# Patient Record
Sex: MALE | Race: WHITE | NOT HISPANIC OR LATINO | Employment: OTHER | URBAN - METROPOLITAN AREA
[De-identification: names, ages, dates, MRNs, and addresses within clinical notes are randomized per-mention and may not be internally consistent; named-entity substitution may affect disease eponyms.]

---

## 2023-11-15 ENCOUNTER — OFFICE VISIT (OUTPATIENT)
Dept: ORTHOPEDIC SURGERY | Facility: CLINIC | Age: 73
End: 2023-11-15
Payer: MEDICARE

## 2023-11-15 ENCOUNTER — ANCILLARY PROCEDURE (OUTPATIENT)
Dept: RADIOLOGY | Facility: CLINIC | Age: 73
End: 2023-11-15
Payer: MEDICARE

## 2023-11-15 DIAGNOSIS — M79.671 RIGHT FOOT PAIN: ICD-10-CM

## 2023-11-15 DIAGNOSIS — M19.079 ARTHRITIS OF FOOT: ICD-10-CM

## 2023-11-15 DIAGNOSIS — S99.921D INJURY OF RIGHT FOOT, SUBSEQUENT ENCOUNTER: ICD-10-CM

## 2023-11-15 DIAGNOSIS — T84.498S FAILED ORTHOPEDIC IMPLANT, SEQUELA: Primary | ICD-10-CM

## 2023-11-15 PROBLEM — T84.498A FAILED ORTHOPEDIC IMPLANT (CMS-HCC): Status: ACTIVE | Noted: 2023-11-15

## 2023-11-15 PROCEDURE — 73630 X-RAY EXAM OF FOOT: CPT | Mod: RT,FY

## 2023-11-15 PROCEDURE — 99204 OFFICE O/P NEW MOD 45 MIN: CPT | Performed by: ORTHOPAEDIC SURGERY

## 2023-11-15 PROCEDURE — 1160F RVW MEDS BY RX/DR IN RCRD: CPT | Performed by: ORTHOPAEDIC SURGERY

## 2023-11-15 PROCEDURE — 1159F MED LIST DOCD IN RCRD: CPT | Performed by: ORTHOPAEDIC SURGERY

## 2023-11-15 PROCEDURE — 73630 X-RAY EXAM OF FOOT: CPT | Mod: RIGHT SIDE | Performed by: RADIOLOGY

## 2023-11-15 RX ORDER — SODIUM CHLORIDE, SODIUM LACTATE, POTASSIUM CHLORIDE, CALCIUM CHLORIDE 600; 310; 30; 20 MG/100ML; MG/100ML; MG/100ML; MG/100ML
100 INJECTION, SOLUTION INTRAVENOUS CONTINUOUS
Status: CANCELLED | OUTPATIENT
Start: 2023-11-15

## 2023-11-15 NOTE — PROGRESS NOTES
73-year-old male returns for new problem.  Seen years ago for left Achilles.  Has had progressive pain in the right forefoot.  Had a remote podiatric procedure on his right great toe with a silicone implant.  Had increasing pain around the right great second toe.  Some pain in the third toe we had previous hammertoe surgery.  Likes to walk for exercise.  Pain is getting worse over time.  No pain along the arch.    On exam:  WD/WN athletic male  A+O X3  NAD  No lymphedema  Inspection of both feet and ankles show symmetric arches.   Tender crepitus with passive motion second MTP joint right foot.  Pain with limited motion right great toe.  Pain in the DIP joint right third toe with malrotation.  5/5 strength in all 4 planes.   Sensation intact to LT.   Good pulses.   Stable anterior drawer.  No peroneal subluxation.    (-) Silverskold.     I personally reviewed the following radiographic exams: Right foot shows progressive arthrosis of the second MTP joint.  No acute changes.  Erosion of the first MTP silicone implant without significant change since 2016.    Assessment: Failed right first MTP silicone implant.  Progressive arthritis right second MTP joint.  Painful right mallet toe.    Plan: Discussed nonoperative and operative options in detail.   Risk and benefits discussed in detail. All questions answered today.  Recovery timeline and expectations discussed in detail.  Discussed surgical correction.  Would need implant removal from the first MTP joint and fusion.  We will plan on using allograft bone chips and DBM and to avoid ICBG.  Discussed interposition arthroplasty for the second MTP joint.  Discussed revision DIP fusion with the third toe.  Right first MTP fusion [id number]; right second MTP arthroplasty with interposition 10598; revision third DIP fusion [id number]., Arthrex first MTP set ; K wire C-arm,.. General plus regional.  60 minutes  Crutches or Walker/ Consider knee scooter   postop shoe

## 2023-12-13 ENCOUNTER — ANCILLARY PROCEDURE (OUTPATIENT)
Dept: RADIOLOGY | Facility: CLINIC | Age: 73
End: 2023-12-13
Payer: MEDICARE

## 2023-12-13 ENCOUNTER — OFFICE VISIT (OUTPATIENT)
Dept: ORTHOPEDIC SURGERY | Facility: CLINIC | Age: 73
End: 2023-12-13
Payer: MEDICARE

## 2023-12-13 VITALS — BODY MASS INDEX: 35.29 KG/M2 | HEIGHT: 74 IN | WEIGHT: 275 LBS

## 2023-12-13 DIAGNOSIS — M76.62 ACHILLES TENDINITIS OF LEFT LOWER EXTREMITY: ICD-10-CM

## 2023-12-13 DIAGNOSIS — M19.079 ARTHRITIS OF FOOT: ICD-10-CM

## 2023-12-13 DIAGNOSIS — M77.9 BONE SPUR: Primary | ICD-10-CM

## 2023-12-13 PROCEDURE — 1159F MED LIST DOCD IN RCRD: CPT | Performed by: ORTHOPAEDIC SURGERY

## 2023-12-13 PROCEDURE — 1125F AMNT PAIN NOTED PAIN PRSNT: CPT | Performed by: ORTHOPAEDIC SURGERY

## 2023-12-13 PROCEDURE — 1160F RVW MEDS BY RX/DR IN RCRD: CPT | Performed by: ORTHOPAEDIC SURGERY

## 2023-12-13 PROCEDURE — 73630 X-RAY EXAM OF FOOT: CPT | Mod: LT,FY

## 2023-12-13 PROCEDURE — 1036F TOBACCO NON-USER: CPT | Performed by: ORTHOPAEDIC SURGERY

## 2023-12-13 PROCEDURE — 99214 OFFICE O/P EST MOD 30 MIN: CPT | Performed by: ORTHOPAEDIC SURGERY

## 2023-12-13 PROCEDURE — 73630 X-RAY EXAM OF FOOT: CPT | Mod: LEFT SIDE | Performed by: RADIOLOGY

## 2023-12-13 ASSESSMENT — PAIN SCALES - GENERAL: PAINLEVEL_OUTOF10: 2

## 2023-12-13 ASSESSMENT — PAIN DESCRIPTION - DESCRIPTORS: DESCRIPTORS: ACHING

## 2023-12-13 ASSESSMENT — PAIN - FUNCTIONAL ASSESSMENT: PAIN_FUNCTIONAL_ASSESSMENT: 0-10

## 2023-12-13 NOTE — PROGRESS NOTES
Was considering R foot surgery in January. 3 weeks ago L foot started hurting.  Noted some pain over the medial aspect of the left great toe.  Is having some Achilles tendon pain.  A lot of this occurred when he was trying to work with crutches and walker to be nonweightbearing for his right foot surgery.  Does have a knee scooter as well.  Really has no left foot pain when walking barefoot or in slippers at home.    Exam: Tender spur dorsal medial first metatarsal.  Limited first MTP motion without pain.  Mild tenderness over the left Achilles.  No defect.  Negative Acevedo test.    I personally reviewed the following radiographic exams: Left foot shows dorsal medial spur first metatarsal.  Symmetric joint space narrowing.  No acute changes.    Assessment: Left first MTP arthrosis with spur.  Achilles tendinitis.  Failed right forefoot podiatric surgery.    Plan: Discussed nonoperative and operative options in detail.   Risk and benefits discussed in detail. All questions answered today.  Recovery timeline and expectations discussed in detail.  While cheilectomy or first MTP fusion on the left is reasonable, we discussed there is right foot surgery and recovery in detail.  He was under the impression he would be nonweightbearing for an extended period of time after his right foot surgery.  He would really only be nonweightbearing for 24 to 36 hours with the blocks in place.  He thinks he can manage this.  If down the road his left foot continues to bother him there are surgical options if necessary.  He will proceed with a right foot surgery as scheduled in January.

## 2024-01-10 ENCOUNTER — APPOINTMENT (OUTPATIENT)
Dept: PREADMISSION TESTING | Facility: HOSPITAL | Age: 74
End: 2024-01-10
Payer: MEDICARE

## 2024-01-10 RX ORDER — ASPIRIN 81 MG/1
81 TABLET ORAL DAILY
COMMUNITY

## 2024-01-10 RX ORDER — ASCORBATE CALCIUM/BIOFLAVONOID 500-250 MG
1 TABLET ORAL DAILY
COMMUNITY

## 2024-01-10 RX ORDER — DOCUSATE SODIUM 100 MG/1
100 CAPSULE, LIQUID FILLED ORAL 2 TIMES DAILY
COMMUNITY

## 2024-01-10 RX ORDER — DICLOFENAC SODIUM 10 MG/G
4 GEL TOPICAL 4 TIMES DAILY PRN
COMMUNITY

## 2024-01-10 RX ORDER — ATORVASTATIN CALCIUM 40 MG/1
40 TABLET, FILM COATED ORAL DAILY
COMMUNITY

## 2024-01-10 RX ORDER — AMLODIPINE BESYLATE 5 MG/1
5 TABLET ORAL DAILY
COMMUNITY

## 2024-01-10 RX ORDER — CICLOPIROX OLAMINE 7.7 MG/G
1 CREAM TOPICAL 2 TIMES DAILY
COMMUNITY

## 2024-01-10 RX ORDER — HYDROCHLOROTHIAZIDE 25 MG/1
25 TABLET ORAL DAILY
COMMUNITY

## 2024-01-10 RX ORDER — FAMOTIDINE 20 MG/1
20 TABLET, FILM COATED ORAL 2 TIMES DAILY
COMMUNITY

## 2024-01-10 RX ORDER — POTASSIUM CHLORIDE 20 MEQ/1
20 TABLET, EXTENDED RELEASE ORAL DAILY
COMMUNITY

## 2024-01-10 RX ORDER — LOSARTAN POTASSIUM 100 MG/1
100 TABLET ORAL DAILY
COMMUNITY

## 2024-01-10 RX ORDER — ASCORBIC ACID 500 MG
500 TABLET ORAL DAILY
COMMUNITY

## 2024-01-10 RX ORDER — SILDENAFIL CITRATE 20 MG/1
20 TABLET ORAL 3 TIMES DAILY
COMMUNITY

## 2024-01-10 RX ORDER — BISMUTH SUBSALICYLATE 262 MG
1 TABLET,CHEWABLE ORAL DAILY
COMMUNITY

## 2024-01-12 ENCOUNTER — PRE-ADMISSION TESTING (OUTPATIENT)
Dept: PREADMISSION TESTING | Facility: HOSPITAL | Age: 74
End: 2024-01-12
Payer: MEDICARE

## 2024-01-12 ENCOUNTER — LAB (OUTPATIENT)
Dept: LAB | Facility: LAB | Age: 74
End: 2024-01-12
Payer: MEDICARE

## 2024-01-12 VITALS
SYSTOLIC BLOOD PRESSURE: 134 MMHG | OXYGEN SATURATION: 97 % | WEIGHT: 272.71 LBS | HEART RATE: 67 BPM | RESPIRATION RATE: 16 BRPM | DIASTOLIC BLOOD PRESSURE: 71 MMHG | TEMPERATURE: 97.7 F | BODY MASS INDEX: 36.14 KG/M2 | HEIGHT: 73 IN

## 2024-01-12 DIAGNOSIS — Z01.818 PREOP TESTING: Primary | ICD-10-CM

## 2024-01-12 DIAGNOSIS — S99.921D INJURY OF RIGHT FOOT, SUBSEQUENT ENCOUNTER: ICD-10-CM

## 2024-01-12 DIAGNOSIS — T84.498S FAILED ORTHOPEDIC IMPLANT, SEQUELA: ICD-10-CM

## 2024-01-12 DIAGNOSIS — M19.079 ARTHRITIS OF FOOT: ICD-10-CM

## 2024-01-12 LAB
ANION GAP SERPL CALC-SCNC: 11 MMOL/L (ref 10–20)
BUN SERPL-MCNC: 14 MG/DL (ref 6–23)
CALCIUM SERPL-MCNC: 9.3 MG/DL (ref 8.6–10.3)
CHLORIDE SERPL-SCNC: 102 MMOL/L (ref 98–107)
CO2 SERPL-SCNC: 29 MMOL/L (ref 21–32)
CREAT SERPL-MCNC: 0.74 MG/DL (ref 0.5–1.3)
EGFRCR SERPLBLD CKD-EPI 2021: >90 ML/MIN/1.73M*2
ERYTHROCYTE [DISTWIDTH] IN BLOOD BY AUTOMATED COUNT: 12.1 % (ref 11.5–14.5)
GLUCOSE SERPL-MCNC: 103 MG/DL (ref 74–99)
HCT VFR BLD AUTO: 42.2 % (ref 41–52)
HGB BLD-MCNC: 14.4 G/DL (ref 13.5–17.5)
MCH RBC QN AUTO: 31.9 PG (ref 26–34)
MCHC RBC AUTO-ENTMCNC: 34.1 G/DL (ref 32–36)
MCV RBC AUTO: 93 FL (ref 80–100)
NRBC BLD-RTO: 0 /100 WBCS (ref 0–0)
PLATELET # BLD AUTO: 169 X10*3/UL (ref 150–450)
POTASSIUM SERPL-SCNC: 3.6 MMOL/L (ref 3.5–5.3)
RBC # BLD AUTO: 4.52 X10*6/UL (ref 4.5–5.9)
SODIUM SERPL-SCNC: 138 MMOL/L (ref 136–145)
WBC # BLD AUTO: 6.4 X10*3/UL (ref 4.4–11.3)

## 2024-01-12 PROCEDURE — 85027 COMPLETE CBC AUTOMATED: CPT

## 2024-01-12 PROCEDURE — 87081 CULTURE SCREEN ONLY: CPT | Mod: AHULAB | Performed by: ORTHOPAEDIC SURGERY

## 2024-01-12 PROCEDURE — 99203 OFFICE O/P NEW LOW 30 MIN: CPT | Performed by: PHYSICIAN ASSISTANT

## 2024-01-12 PROCEDURE — 80048 BASIC METABOLIC PNL TOTAL CA: CPT

## 2024-01-12 PROCEDURE — 93005 ELECTROCARDIOGRAM TRACING: CPT | Performed by: PHYSICIAN ASSISTANT

## 2024-01-12 PROCEDURE — 36415 COLL VENOUS BLD VENIPUNCTURE: CPT

## 2024-01-12 RX ORDER — ACETAMINOPHEN 500 MG
1000 TABLET ORAL EVERY 6 HOURS PRN
COMMUNITY

## 2024-01-12 RX ORDER — CHLORHEXIDINE GLUCONATE ORAL RINSE 1.2 MG/ML
SOLUTION DENTAL
Qty: 473 ML | Refills: 0 | Status: SHIPPED | OUTPATIENT
Start: 2024-01-12

## 2024-01-12 ASSESSMENT — ENCOUNTER SYMPTOMS
GASTROINTESTINAL NEGATIVE: 1
EYES NEGATIVE: 1
CONSTITUTIONAL NEGATIVE: 1
PSYCHIATRIC NEGATIVE: 1
RESPIRATORY NEGATIVE: 1
CARDIOVASCULAR NEGATIVE: 1
HEMATOLOGIC/LYMPHATIC NEGATIVE: 1
ALLERGIC/IMMUNOLOGIC NEGATIVE: 1
NEUROLOGICAL NEGATIVE: 1
ENDOCRINE NEGATIVE: 1

## 2024-01-12 NOTE — H&P (VIEW-ONLY)
[facility]/PAT Evaluation       Name: Scotty Amaro (Scotty Amaro)  /Age: 1950/73 y.o.     In-Person       Chief Complaint: Failed orthopedic implant, sequela /    Arthritis of foot     HPI      Date of Consult: 24    Referring Provider: Dr. Villa     Surgery, Date, and Length: Right Foot Arthrodesis /  Excision Bone Foot / Right Foot Claw/Hammertoe Correction ; 24; 120 minutes     Scotty Amaro is a 73 year-old male who presents to the [facility] for perioperative risk assessment prior to surgery.  He has had progressive pain in the right forefoot.  Had a remote podiatric procedure on his right great toe with a silicone implant.  Had increasing pain around the right great second toe.  Some pain in the third toe we had previous hammertoe surgery.  Likes to walk for exercise.  Pain is getting worse over time.     This note was created in part upon personal review of patient's medical records.      Patient is scheduled to have Right Foot Arthrodesis /  Excision Bone Foot / Right Foot Claw/Hammertoe Correction     Medical History  Past Medical History:   Diagnosis Date    Arthritis     Basal cell carcinoma     face    Cardiology follow-up encounter 2023    Lee Boston MD    Diverticulosis of colon     DM2 (diabetes mellitus, type 2) (CMS/[facility])     23 A1C 5.8    Hyperlipidemia     Hypertension     Lumbar spinal stenosis     RADHA (obstructive sleep apnea)     uses CPAP    Primary prostate cancer (CMS/[facility])     PVC's (premature ventricular contractions)     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 2018        STOP BANG = +RADHA    Caprini = 7    Surgical History  Past Surgical History:   Procedure Laterality Date    ABDOMINAL WALL MESH  REMOVAL  1998    ACHILLES TENDON SURGERY Left 2016    CARDIAC CATHETERIZATION      no intervention    CARPAL TUNNEL RELEASE Bilateral     COLONOSCOPY  2020    ESOPHAGOGASTRODUODENOSCOPY  2020    FOOT Bilateral     HERNIA REPAIR  1950     Inguinal    PROSTATE BIOPSY  2016    PROSTATECTOMY  1997    REVERSE TOTAL SHOULDER ARTHROPLASTY Right 2019    TOTAL HIP ARTHROPLASTY Left 2019    TOTAL HIP ARTHROPLASTY Right 2004    TOTAL KNEE ARTHROPLASTY Right 2015    UMBILICAL HERNIA REPAIR  2018    UMBILICAL HERNIA REPAIR  1998    VENTRAL HERNIA REPAIR  2018    XR TOE GREAT RT Right     right great toe joint implant             Family history:  Family History   Problem Relation Name Age of Onset    Transient ischemic attack Mother      Prostate cancer Father      Aortic aneurysm Father      Stroke Sister      Sick sinus syndrome Brother          Pacemaker    Sudden death Brother  45        Social history:  Social History     Socioeconomic History    Marital status:      Spouse name: Not on file    Number of children: Not on file    Years of education: Not on file    Highest education level: Not on file   Occupational History    Not on file   Tobacco Use    Smoking status: Former     Packs/day: 2.50     Years: 18.00     Additional pack years: 0.00     Total pack years: 45.00     Types: Cigarettes     Quit date:      Years since quittin.0    Smokeless tobacco: Never   Vaping Use    Vaping Use: Never used   Substance and Sexual Activity    Alcohol use: Yes     Alcohol/week: 4.0 standard drinks of alcohol     Types: 4 Standard drinks or equivalent per week    Drug use: Never    Sexual activity: Defer   Other Topics Concern    Not on file   Social History Narrative    Not on file     Social Determinants of Health     Financial Resource Strain: Not on file   Food Insecurity: Not on file   Transportation Needs: Not on file   Physical Activity: Not on file   Stress: Not on file   Social Connections: Not on file   Intimate Partner Violence: Not on file   Housing Stability: Not on file          Current Outpatient Medications:     acetaminophen (Tylenol) 500 mg tablet, Take 2 tablets (1,000 mg) by mouth every 6 hours if needed for mild pain (1 - 3)., Disp:  , Rfl:     amLODIPine (Norvasc) 5 mg tablet, Take 1 tablet (5 mg) by mouth once daily., Disp: , Rfl:     ascorbic acid (Vitamin C) 500 mg tablet, Take 1 tablet (500 mg) by mouth once daily., Disp: , Rfl:     aspirin 81 mg EC tablet, Take 1 tablet (81 mg) by mouth once daily., Disp: , Rfl:     atorvastatin (Lipitor) 40 mg tablet, Take 1 tablet (40 mg) by mouth once daily., Disp: , Rfl:     Bacillus subtilis-inulin 1.5 billion cell-1 gram tablet,chewable, Chew 1 Dose once daily., Disp: , Rfl:     ciclopirox (Loprox) 0.77 % cream, Apply 1 Application topically 2 times a day., Disp: , Rfl:     COQ10, UBIQUINOL, ORAL, Take 300 mg by mouth once daily., Disp: , Rfl:     diclofenac sodium (Voltaren) 1 % gel gel, Apply 1 Application topically 4 times a day as needed (Apply to foot and ankle for arthritis up to 4 times a day)., Disp: , Rfl:     docusate sodium (Colace) 100 mg capsule, Take 1 capsule (100 mg) by mouth 2 times a day., Disp: , Rfl:     famotidine (Pepcid) 20 mg tablet, Take 1 tablet (20 mg) by mouth 2 times a day., Disp: , Rfl:     hydroCHLOROthiazide (HYDRODiuril) 25 mg tablet, Take 1 tablet (25 mg) by mouth once daily., Disp: , Rfl:     losartan (Cozaar) 100 mg tablet, Take 1 tablet (100 mg) by mouth once daily., Disp: , Rfl:     lutein-zeaxanthin 20-4 mg capsule, Take 1 Dose by mouth once daily., Disp: , Rfl:     multivitamin tablet, Take 1 tablet by mouth once daily., Disp: , Rfl:     potassium chloride CR 20 mEq ER tablet, Take 1 tablet (20 mEq) by mouth once daily. Do not crush or chew., Disp: , Rfl:     psyllium (Metamucil) powder, Take 1 Dose (5.8 g) by mouth once daily., Disp: , Rfl:     semaglutide 2 mg/dose (8 mg/3 mL) pen injector, Inject 2 mg under the skin every 7 days. Monday, Disp: , Rfl:     sildenafil (Revatio) 20 mg tablet, Take 1 tablet (20 mg) by mouth 3 times a day., Disp: , Rfl:     chlorhexidine (Peridex) 0.12 % solution, 15 ml swish and spit for 30 seconds night prior to surgery and  "morning of surgery, Disp: 473 mL, Rfl: 0    EPINEPHrine 0.3 mg/0.3 mL injection syringe, Inject 0.3 mL (0.3 mg) into the muscle 1 time if needed for anaphylaxis. Inject into upper leg. Call 911 after use., Disp: , Rfl:          Visit Vitals  /71   Pulse 67   Temp 36.5 °C (97.7 °F)   Resp 16   Ht 1.854 m (6' 1\")   Wt 124 kg (272 lb 11.3 oz)   SpO2 97%   BMI 35.98 kg/m²   Smoking Status Former   BSA 2.53 m²           Review of Systems   Constitutional: Negative.    HENT: Negative.     Eyes: Negative.    Respiratory: Negative.     Cardiovascular: Negative.    Gastrointestinal: Negative.    Endocrine: Negative.    Genitourinary: Negative.    Musculoskeletal:         Right foot pain; arthritic pain bilateral hands/wrists   Skin: Negative.    Allergic/Immunologic: Negative.    Neurological: Negative.    Hematological: Negative.    Psychiatric/Behavioral: Negative.          Physical Exam  Constitutional:       Appearance: Normal appearance.   HENT:      Head: Normocephalic and atraumatic.      Right Ear: External ear normal.      Left Ear: External ear normal.      Nose: Nose normal.      Mouth/Throat:      Pharynx: Oropharynx is clear.   Eyes:      Conjunctiva/sclera: Conjunctivae normal.   Cardiovascular:      Rate and Rhythm: Normal rate and regular rhythm.      Heart sounds: Normal heart sounds.   Pulmonary:      Effort: Pulmonary effort is normal.      Breath sounds: Normal breath sounds.   Abdominal:      General: Abdomen is flat.      Palpations: Abdomen is soft.   Musculoskeletal:         General: Normal range of motion.      Cervical back: Normal range of motion and neck supple.   Skin:     General: Skin is warm and dry.   Neurological:      General: No focal deficit present.      Mental Status: He is alert and oriented to person, place, and time.   Psychiatric:         Mood and Affect: Mood normal.         Behavior: Behavior normal.         Thought Content: Thought content normal.         Judgment: Judgment " normal.          PAT AIRWAY:   Airway:     Mallampati::  II    Neck ROM::  Full    Lab Results   Component Value Date    WBC 6.4 01/12/2024    HGB 14.4 01/12/2024    HCT 42.2 01/12/2024    MCV 93 01/12/2024     01/12/2024      Lab Results   Component Value Date    GLUCOSE 103 (H) 01/12/2024    CALCIUM 9.3 01/12/2024     01/12/2024    K 3.6 01/12/2024    CO2 29 01/12/2024     01/12/2024    BUN 14 01/12/2024    CREATININE 0.74 01/12/2024      HgbA1c 12/14/23  Hemoglobin A1C  4.8 - 5.6 % 5.8 High      EKG 1/12/24  NSR  Left axis deviation  67 BPM      NMST 4/10/23  CONCLUSIONS:    1. SPECT Perfusion Study: Normal.    2. There is no scintigraphic evidence for inducible ischemia.    3. No evidence of scarred myocardium.    4. Left ventricle is normal in size. The left ventricle systolic   function is normal.    5. This is a low risk scan.       RCRI  0  , 3.9 % Risk of MACE    Cardiac  HTN - losartan - HOLD 24 hours prior to surgery; continue amlodipine and hydrochlorothiazide DOS   HLD - continue atorvastatin DOS   Pulmonary  RADHA - Please bring CPAP with you DOS    Endocrinology  DM - HOLD semaglutide 1 week prior to surgery  Hematology       Patient instructed to ambulate as soon as possible postoperatively to decrease thromboembolic risk.      Initiate mechanical DVT prophylaxis as soon as possible and initiate chemical prophylaxis when deemed safe from a bleeding standpoint post surgery.       VTE prophylaxis per surgical team   GI  GERD - continue famotidine DOS     Tests ordered in PAT: cbc, bmp, mrsa, EKG  LABS REVIEWED: unremarkable     Risk assessment complete.  Patient is scheduled for a low surgical risk procedure.        Preoperative medication instructions were provided and reviewed with the patient.  Any additional testing or evaluation was explained to the patient.  Nothing by mouth instructions were discussed and patient's questions were answered prior to conclusion to this encounter.   Patient verbalized understanding of preoperative instructions given in preadmission testing; discharge instructions available in EMR.    This note was dictated by a speech recognition.  Minor errors may have been detected in a speech recognition.

## 2024-01-12 NOTE — CPM/PAT H&P
[facility]/PAT Evaluation       Name: Scotty Amaro (Scotty Amaro)  /Age: 1950/73 y.o.     In-Person       Chief Complaint: Failed orthopedic implant, sequela /    Arthritis of foot     HPI      Date of Consult: 24    Referring Provider: Dr. Villa     Surgery, Date, and Length: Right Foot Arthrodesis /  Excision Bone Foot / Right Foot Claw/Hammertoe Correction ; 24; 120 minutes     Scotty Amaro is a 73 year-old male who presents to the [facility] for perioperative risk assessment prior to surgery.  He has had progressive pain in the right forefoot.  Had a remote podiatric procedure on his right great toe with a silicone implant.  Had increasing pain around the right great second toe.  Some pain in the third toe we had previous hammertoe surgery.  Likes to walk for exercise.  Pain is getting worse over time.     This note was created in part upon personal review of patient's medical records.      Patient is scheduled to have Right Foot Arthrodesis /  Excision Bone Foot / Right Foot Claw/Hammertoe Correction     Medical History  Past Medical History:   Diagnosis Date    Arthritis     Basal cell carcinoma     face    Cardiology follow-up encounter 2023    Lee Boston MD    Diverticulosis of colon     DM2 (diabetes mellitus, type 2) (CMS/[facility])     23 A1C 5.8    Hyperlipidemia     Hypertension     Lumbar spinal stenosis     RADHA (obstructive sleep apnea)     uses CPAP    Primary prostate cancer (CMS/[facility])     PVC's (premature ventricular contractions)     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 2018        STOP BANG = +RADHA    Caprini = 7    Surgical History  Past Surgical History:   Procedure Laterality Date    ABDOMINAL WALL MESH  REMOVAL  1998    ACHILLES TENDON SURGERY Left 2016    CARDIAC CATHETERIZATION      no intervention    CARPAL TUNNEL RELEASE Bilateral     COLONOSCOPY  2020    ESOPHAGOGASTRODUODENOSCOPY  2020    FOOT Bilateral     HERNIA REPAIR  1950     Inguinal    PROSTATE BIOPSY  2016    PROSTATECTOMY  1997    REVERSE TOTAL SHOULDER ARTHROPLASTY Right 2019    TOTAL HIP ARTHROPLASTY Left 2019    TOTAL HIP ARTHROPLASTY Right 2004    TOTAL KNEE ARTHROPLASTY Right 2015    UMBILICAL HERNIA REPAIR  2018    UMBILICAL HERNIA REPAIR  1998    VENTRAL HERNIA REPAIR  2018    XR TOE GREAT RT Right     right great toe joint implant             Family history:  Family History   Problem Relation Name Age of Onset    Transient ischemic attack Mother      Prostate cancer Father      Aortic aneurysm Father      Stroke Sister      Sick sinus syndrome Brother          Pacemaker    Sudden death Brother  45        Social history:  Social History     Socioeconomic History    Marital status:      Spouse name: Not on file    Number of children: Not on file    Years of education: Not on file    Highest education level: Not on file   Occupational History    Not on file   Tobacco Use    Smoking status: Former     Packs/day: 2.50     Years: 18.00     Additional pack years: 0.00     Total pack years: 45.00     Types: Cigarettes     Quit date:      Years since quittin.0    Smokeless tobacco: Never   Vaping Use    Vaping Use: Never used   Substance and Sexual Activity    Alcohol use: Yes     Alcohol/week: 4.0 standard drinks of alcohol     Types: 4 Standard drinks or equivalent per week    Drug use: Never    Sexual activity: Defer   Other Topics Concern    Not on file   Social History Narrative    Not on file     Social Determinants of Health     Financial Resource Strain: Not on file   Food Insecurity: Not on file   Transportation Needs: Not on file   Physical Activity: Not on file   Stress: Not on file   Social Connections: Not on file   Intimate Partner Violence: Not on file   Housing Stability: Not on file          Current Outpatient Medications:     acetaminophen (Tylenol) 500 mg tablet, Take 2 tablets (1,000 mg) by mouth every 6 hours if needed for mild pain (1 - 3)., Disp:  , Rfl:     amLODIPine (Norvasc) 5 mg tablet, Take 1 tablet (5 mg) by mouth once daily., Disp: , Rfl:     ascorbic acid (Vitamin C) 500 mg tablet, Take 1 tablet (500 mg) by mouth once daily., Disp: , Rfl:     aspirin 81 mg EC tablet, Take 1 tablet (81 mg) by mouth once daily., Disp: , Rfl:     atorvastatin (Lipitor) 40 mg tablet, Take 1 tablet (40 mg) by mouth once daily., Disp: , Rfl:     Bacillus subtilis-inulin 1.5 billion cell-1 gram tablet,chewable, Chew 1 Dose once daily., Disp: , Rfl:     ciclopirox (Loprox) 0.77 % cream, Apply 1 Application topically 2 times a day., Disp: , Rfl:     COQ10, UBIQUINOL, ORAL, Take 300 mg by mouth once daily., Disp: , Rfl:     diclofenac sodium (Voltaren) 1 % gel gel, Apply 1 Application topically 4 times a day as needed (Apply to foot and ankle for arthritis up to 4 times a day)., Disp: , Rfl:     docusate sodium (Colace) 100 mg capsule, Take 1 capsule (100 mg) by mouth 2 times a day., Disp: , Rfl:     famotidine (Pepcid) 20 mg tablet, Take 1 tablet (20 mg) by mouth 2 times a day., Disp: , Rfl:     hydroCHLOROthiazide (HYDRODiuril) 25 mg tablet, Take 1 tablet (25 mg) by mouth once daily., Disp: , Rfl:     losartan (Cozaar) 100 mg tablet, Take 1 tablet (100 mg) by mouth once daily., Disp: , Rfl:     lutein-zeaxanthin 20-4 mg capsule, Take 1 Dose by mouth once daily., Disp: , Rfl:     multivitamin tablet, Take 1 tablet by mouth once daily., Disp: , Rfl:     potassium chloride CR 20 mEq ER tablet, Take 1 tablet (20 mEq) by mouth once daily. Do not crush or chew., Disp: , Rfl:     psyllium (Metamucil) powder, Take 1 Dose (5.8 g) by mouth once daily., Disp: , Rfl:     semaglutide 2 mg/dose (8 mg/3 mL) pen injector, Inject 2 mg under the skin every 7 days. Monday, Disp: , Rfl:     sildenafil (Revatio) 20 mg tablet, Take 1 tablet (20 mg) by mouth 3 times a day., Disp: , Rfl:     chlorhexidine (Peridex) 0.12 % solution, 15 ml swish and spit for 30 seconds night prior to surgery and  "morning of surgery, Disp: 473 mL, Rfl: 0    EPINEPHrine 0.3 mg/0.3 mL injection syringe, Inject 0.3 mL (0.3 mg) into the muscle 1 time if needed for anaphylaxis. Inject into upper leg. Call 911 after use., Disp: , Rfl:          Visit Vitals  /71   Pulse 67   Temp 36.5 °C (97.7 °F)   Resp 16   Ht 1.854 m (6' 1\")   Wt 124 kg (272 lb 11.3 oz)   SpO2 97%   BMI 35.98 kg/m²   Smoking Status Former   BSA 2.53 m²           Review of Systems   Constitutional: Negative.    HENT: Negative.     Eyes: Negative.    Respiratory: Negative.     Cardiovascular: Negative.    Gastrointestinal: Negative.    Endocrine: Negative.    Genitourinary: Negative.    Musculoskeletal:         Right foot pain; arthritic pain bilateral hands/wrists   Skin: Negative.    Allergic/Immunologic: Negative.    Neurological: Negative.    Hematological: Negative.    Psychiatric/Behavioral: Negative.          Physical Exam  Constitutional:       Appearance: Normal appearance.   HENT:      Head: Normocephalic and atraumatic.      Right Ear: External ear normal.      Left Ear: External ear normal.      Nose: Nose normal.      Mouth/Throat:      Pharynx: Oropharynx is clear.   Eyes:      Conjunctiva/sclera: Conjunctivae normal.   Cardiovascular:      Rate and Rhythm: Normal rate and regular rhythm.      Heart sounds: Normal heart sounds.   Pulmonary:      Effort: Pulmonary effort is normal.      Breath sounds: Normal breath sounds.   Abdominal:      General: Abdomen is flat.      Palpations: Abdomen is soft.   Musculoskeletal:         General: Normal range of motion.      Cervical back: Normal range of motion and neck supple.   Skin:     General: Skin is warm and dry.   Neurological:      General: No focal deficit present.      Mental Status: He is alert and oriented to person, place, and time.   Psychiatric:         Mood and Affect: Mood normal.         Behavior: Behavior normal.         Thought Content: Thought content normal.         Judgment: Judgment " normal.          PAT AIRWAY:   Airway:     Mallampati::  II    Neck ROM::  Full    Lab Results   Component Value Date    WBC 6.4 01/12/2024    HGB 14.4 01/12/2024    HCT 42.2 01/12/2024    MCV 93 01/12/2024     01/12/2024      Lab Results   Component Value Date    GLUCOSE 103 (H) 01/12/2024    CALCIUM 9.3 01/12/2024     01/12/2024    K 3.6 01/12/2024    CO2 29 01/12/2024     01/12/2024    BUN 14 01/12/2024    CREATININE 0.74 01/12/2024      HgbA1c 12/14/23  Hemoglobin A1C  4.8 - 5.6 % 5.8 High      EKG 1/12/24  NSR  Left axis deviation  67 BPM      NMST 4/10/23  CONCLUSIONS:    1. SPECT Perfusion Study: Normal.    2. There is no scintigraphic evidence for inducible ischemia.    3. No evidence of scarred myocardium.    4. Left ventricle is normal in size. The left ventricle systolic   function is normal.    5. This is a low risk scan.       RCRI  0  , 3.9 % Risk of MACE    Cardiac  HTN - losartan - HOLD 24 hours prior to surgery; continue amlodipine and hydrochlorothiazide DOS   HLD - continue atorvastatin DOS   Pulmonary  RADHA - Please bring CPAP with you DOS    Endocrinology  DM - HOLD semaglutide 1 week prior to surgery  Hematology       Patient instructed to ambulate as soon as possible postoperatively to decrease thromboembolic risk.      Initiate mechanical DVT prophylaxis as soon as possible and initiate chemical prophylaxis when deemed safe from a bleeding standpoint post surgery.       VTE prophylaxis per surgical team   GI  GERD - continue famotidine DOS     Tests ordered in PAT: cbc, bmp, mrsa, EKG  LABS REVIEWED: unremarkable     Risk assessment complete.  Patient is scheduled for a low surgical risk procedure.        Preoperative medication instructions were provided and reviewed with the patient.  Any additional testing or evaluation was explained to the patient.  Nothing by mouth instructions were discussed and patient's questions were answered prior to conclusion to this encounter.   Patient verbalized understanding of preoperative instructions given in preadmission testing; discharge instructions available in EMR.    This note was dictated by a speech recognition.  Minor errors may have been detected in a speech recognition.

## 2024-01-12 NOTE — PREPROCEDURE INSTRUCTIONS
Medication List            Accurate as of January 12, 2024 10:33 AM. Always use your most recent med list.                acetaminophen 500 mg tablet  Commonly known as: Tylenol  Notes to patient: Use if needed morning of surgery     amLODIPine 5 mg tablet  Commonly known as: Norvasc  Medication Adjustments for Surgery: Take morning of surgery with sip of water, no other fluids     ascorbic acid 500 mg tablet  Commonly known as: Vitamin C  Medication Adjustments for Surgery: Continue until night before surgery     aspirin 81 mg EC tablet  Medication Adjustments for Surgery: Take morning of surgery with sip of water, no other fluids     atorvastatin 40 mg tablet  Commonly known as: Lipitor  Medication Adjustments for Surgery: Take morning of surgery with sip of water, no other fluids     Bacillus subtilis-inulin 1.5 billion cell-1 gram tablet,chewable  Medication Adjustments for Surgery: Stop 7 days before surgery     ciclopirox 0.77 % cream  Commonly known as: Loprox  Notes to patient: Use if needed morning of surgery     COQ10 (UBIQUINOL) ORAL  Medication Adjustments for Surgery: Stop 7 days before surgery     docusate sodium 100 mg capsule  Commonly known as: Colace  Medication Adjustments for Surgery: Continue until night before surgery     EPINEPHrine 0.3 mg/0.3 mL injection syringe  Commonly known as: Epipen  Notes to patient: Use if needed morning of surgery     famotidine 20 mg tablet  Commonly known as: Pepcid  Medication Adjustments for Surgery: Take morning of surgery with sip of water, no other fluids     hydroCHLOROthiazide 25 mg tablet  Commonly known as: HYDRODiuril  Medication Adjustments for Surgery: Take morning of surgery with sip of water, no other fluids     losartan 100 mg tablet  Commonly known as: Cozaar  Notes to patient: HOLD 24 hours prior to surgery     lutein-zeaxanthin 20-4 mg capsule  Medication Adjustments for Surgery: Stop 7 days before surgery     multivitamin tablet  Medication  Adjustments for Surgery: Stop 7 days before surgery     potassium chloride CR 20 mEq ER tablet  Commonly known as: Klor-Con M20  Medication Adjustments for Surgery: Continue until night before surgery     psyllium powder  Commonly known as: Metamucil  Medication Adjustments for Surgery: Continue until night before surgery     semaglutide 2 mg/dose (8 mg/3 mL) pen injector  Medication Adjustments for Surgery: Stop 7 days before surgery     sildenafil 20 mg tablet  Commonly known as: Revatio  Medication Adjustments for Surgery: Stop 3 days before surgery     Voltaren 1 % gel gel  Generic drug: diclofenac sodium  Notes to patient: Use if needed morning of surgery                 CONTACT SURGEON'S OFFICE IF YOU DEVELOP:  * Fever = 100.4 F   * New respiratory symptoms (e.g. cough, shortness of breath, respiratory distress, sore throat)  * Recent loss of taste or smell  *Flu like symptoms such as headache, fatigue or gastrointestinal symptoms  * You develop any open sores, shingles, burning or painful urination   AND/OR:  * You no longer wish to have the surgery.  * Any other personal circumstances change that may lead to the need to cancel or defer this surgery.  *You were admitted to any hospital within one week of your planned procedure.    SMOKING:  *Quitting smoking can make a huge difference to your health and recovery from surgery.    *If you need help with quitting, call 6-802-QUIT-NOW.    THE DAY BEFORE SURGERY:  *Do not eat any food after midnight the night before surgery.   *You are permitted to drink clear liquids (i.e. water, black coffee, tea, clear broth, apple juice) up to 2 hours before your surgery.    DIABETICS:  Nothing by mouth (solid or liquid) for 8 hours prior to surgery. Please check fasting blood sugar upon waking up.  If fasting sugar is <80 mg/dl, please drink 100ml/3oz of apple juice no later than 2 hours prior to surgery.      SURGICAL TIME  *You will be contacted between 2 p.m. and 6 p.m.  the business day before your surgery with your arrival time.  *If you haven't received a call by 6pm, call [phone].  *Scheduled surgery times may change and you will be notified if this occurs-check your personal voicemail for any updates.    ON THE MORNING OF SURGERY:  *Wear comfortable, loose fitting clothing.   *Do not use moisturizers, creams, lotions or perfume.  *All jewelry and valuables should be left at home.  *Prosthetic devices such as contact lenses, hearing aids, dentures, eyelash extensions, hairpins and body piercing must be removed before surgery.    BRING WITH YOU:  *Photo ID and insurance card  *Current list of medicines and allergies  *Pacemaker/Defibrillator/Heart stent cards  *CPAP machine and mask  *Slings/splints/crutches  *Copy of your complete Advanced Directive/DHPOA-if applicable  *Neurostimulator implant remote    PARKING AND ARRIVAL:  *Check in at the Main Entrance desk and let them know you are here for surgery.  *You will be directed to the 2nd floor surgical waiting area.    AFTER OUTPATIENT SURGERY:  *A responsible adult MUST accompany you at the time of discharge and stay with you for 24 hours after your surgery.  *You may NOT drive yourself home after surgery.  *You may use a taxi or ride sharing service ([employer], [employer]) to return home ONLY if you are accompanied by a friend or family member.  *Instructions for resuming your medications will be provided by your surgeon.

## 2024-01-14 LAB — STAPHYLOCOCCUS SPEC CULT: NORMAL

## 2024-01-15 LAB
ATRIAL RATE: 67 BPM
P AXIS: 74 DEGREES
P OFFSET: 176 MS
P ONSET: 126 MS
PR INTERVAL: 186 MS
Q ONSET: 219 MS
QRS COUNT: 11 BEATS
QRS DURATION: 92 MS
QT INTERVAL: 404 MS
QTC CALCULATION(BAZETT): 426 MS
QTC FREDERICIA: 419 MS
R AXIS: -32 DEGREES
T AXIS: 18 DEGREES
T OFFSET: 421 MS
VENTRICULAR RATE: 67 BPM

## 2024-01-18 ENCOUNTER — ANESTHESIA (OUTPATIENT)
Dept: OPERATING ROOM | Facility: HOSPITAL | Age: 74
End: 2024-01-18
Payer: MEDICARE

## 2024-01-18 ENCOUNTER — HOSPITAL ENCOUNTER (OUTPATIENT)
Facility: HOSPITAL | Age: 74
Setting detail: OUTPATIENT SURGERY
Discharge: HOME | End: 2024-01-18
Attending: ORTHOPAEDIC SURGERY | Admitting: ORTHOPAEDIC SURGERY
Payer: MEDICARE

## 2024-01-18 ENCOUNTER — APPOINTMENT (OUTPATIENT)
Dept: RADIOLOGY | Facility: HOSPITAL | Age: 74
End: 2024-01-18
Payer: MEDICARE

## 2024-01-18 ENCOUNTER — ANESTHESIA EVENT (OUTPATIENT)
Dept: OPERATING ROOM | Facility: HOSPITAL | Age: 74
End: 2024-01-18
Payer: MEDICARE

## 2024-01-18 VITALS
DIASTOLIC BLOOD PRESSURE: 70 MMHG | BODY MASS INDEX: 36.41 KG/M2 | WEIGHT: 274.69 LBS | RESPIRATION RATE: 13 BRPM | TEMPERATURE: 97.5 F | HEIGHT: 73 IN | SYSTOLIC BLOOD PRESSURE: 132 MMHG | HEART RATE: 64 BPM | OXYGEN SATURATION: 94 %

## 2024-01-18 DIAGNOSIS — M19.079 ARTHRITIS OF FOOT: ICD-10-CM

## 2024-01-18 DIAGNOSIS — T84.498S FAILED ORTHOPEDIC IMPLANT, SEQUELA: Primary | ICD-10-CM

## 2024-01-18 LAB
GLUCOSE BLD MANUAL STRIP-MCNC: 120 MG/DL (ref 74–99)
GLUCOSE BLD MANUAL STRIP-MCNC: 127 MG/DL (ref 74–99)

## 2024-01-18 PROCEDURE — 7100000009 HC PHASE TWO TIME - INITIAL BASE CHARGE: Performed by: ORTHOPAEDIC SURGERY

## 2024-01-18 PROCEDURE — 7100000002 HC RECOVERY ROOM TIME - EACH INCREMENTAL 1 MINUTE: Performed by: ORTHOPAEDIC SURGERY

## 2024-01-18 PROCEDURE — A4217 STERILE WATER/SALINE, 500 ML: HCPCS | Performed by: ORTHOPAEDIC SURGERY

## 2024-01-18 PROCEDURE — 7100000001 HC RECOVERY ROOM TIME - INITIAL BASE CHARGE: Performed by: ORTHOPAEDIC SURGERY

## 2024-01-18 PROCEDURE — C1713 ANCHOR/SCREW BN/BN,TIS/BN: HCPCS | Performed by: ORTHOPAEDIC SURGERY

## 2024-01-18 PROCEDURE — 2500000004 HC RX 250 GENERAL PHARMACY W/ HCPCS (ALT 636 FOR OP/ED): Performed by: NURSE ANESTHETIST, CERTIFIED REGISTERED

## 2024-01-18 PROCEDURE — 2500000005 HC RX 250 GENERAL PHARMACY W/O HCPCS: Performed by: NURSE ANESTHETIST, CERTIFIED REGISTERED

## 2024-01-18 PROCEDURE — 28750 FUSION OF BIG TOE JOINT: CPT | Performed by: ORTHOPAEDIC SURGERY

## 2024-01-18 PROCEDURE — 2500000001 HC RX 250 WO HCPCS SELF ADMINISTERED DRUGS (ALT 637 FOR MEDICARE OP): Performed by: STUDENT IN AN ORGANIZED HEALTH CARE EDUCATION/TRAINING PROGRAM

## 2024-01-18 PROCEDURE — 2500000004 HC RX 250 GENERAL PHARMACY W/ HCPCS (ALT 636 FOR OP/ED): Performed by: ORTHOPAEDIC SURGERY

## 2024-01-18 PROCEDURE — 3700000001 HC GENERAL ANESTHESIA TIME - INITIAL BASE CHARGE: Performed by: ORTHOPAEDIC SURGERY

## 2024-01-18 PROCEDURE — 2780000003 HC OR 278 NO HCPCS: Performed by: ORTHOPAEDIC SURGERY

## 2024-01-18 PROCEDURE — 28285 REPAIR OF HAMMERTOE: CPT | Performed by: ORTHOPAEDIC SURGERY

## 2024-01-18 PROCEDURE — 2720000007 HC OR 272 NO HCPCS: Performed by: ORTHOPAEDIC SURGERY

## 2024-01-18 PROCEDURE — A28750 PR FUSION BIG TOE,MT-P JT: Performed by: STUDENT IN AN ORGANIZED HEALTH CARE EDUCATION/TRAINING PROGRAM

## 2024-01-18 PROCEDURE — 28122 PARTIAL REMOVAL OF FOOT BONE: CPT | Performed by: ORTHOPAEDIC SURGERY

## 2024-01-18 PROCEDURE — 7100000010 HC PHASE TWO TIME - EACH INCREMENTAL 1 MINUTE: Performed by: ORTHOPAEDIC SURGERY

## 2024-01-18 PROCEDURE — 99100 ANES PT EXTEME AGE<1 YR&>70: CPT | Performed by: STUDENT IN AN ORGANIZED HEALTH CARE EDUCATION/TRAINING PROGRAM

## 2024-01-18 PROCEDURE — 88305 TISSUE EXAM BY PATHOLOGIST: CPT | Performed by: STUDENT IN AN ORGANIZED HEALTH CARE EDUCATION/TRAINING PROGRAM

## 2024-01-18 PROCEDURE — A28750 PR FUSION BIG TOE,MT-P JT: Performed by: NURSE ANESTHETIST, CERTIFIED REGISTERED

## 2024-01-18 PROCEDURE — 76000 FLUOROSCOPY <1 HR PHYS/QHP: CPT | Mod: RT

## 2024-01-18 PROCEDURE — 3700000002 HC GENERAL ANESTHESIA TIME - EACH INCREMENTAL 1 MINUTE: Performed by: ORTHOPAEDIC SURGERY

## 2024-01-18 PROCEDURE — C1769 GUIDE WIRE: HCPCS | Performed by: ORTHOPAEDIC SURGERY

## 2024-01-18 PROCEDURE — 88305 TISSUE EXAM BY PATHOLOGIST: CPT | Mod: TC,SUR,AHULAB,WESLAB | Performed by: ORTHOPAEDIC SURGERY

## 2024-01-18 PROCEDURE — 82947 ASSAY GLUCOSE BLOOD QUANT: CPT

## 2024-01-18 PROCEDURE — 3600000003 HC OR TIME - INITIAL BASE CHARGE - PROCEDURE LEVEL THREE: Performed by: ORTHOPAEDIC SURGERY

## 2024-01-18 PROCEDURE — 2500000005 HC RX 250 GENERAL PHARMACY W/O HCPCS: Performed by: ORTHOPAEDIC SURGERY

## 2024-01-18 PROCEDURE — 3600000008 HC OR TIME - EACH INCREMENTAL 1 MINUTE - PROCEDURE LEVEL THREE: Performed by: ORTHOPAEDIC SURGERY

## 2024-01-18 DEVICE — IMPLANTABLE DEVICE: Type: IMPLANTABLE DEVICE | Site: FOOT | Status: FUNCTIONAL

## 2024-01-18 DEVICE — BONE, PUTTY DBX  5CC DE-MINERAL ASEPTIC: Type: IMPLANTABLE DEVICE | Site: FOOT | Status: FUNCTIONAL

## 2024-01-18 DEVICE — SCREW, LOW PROFILE, CORTICAL, 3.0 X 18MM: Type: IMPLANTABLE DEVICE | Site: FOOT | Status: FUNCTIONAL

## 2024-01-18 DEVICE — BONE, CHIP, CANCELLOUS, 1.7-10 MM, 5 CC: Type: IMPLANTABLE DEVICE | Site: FOOT | Status: FUNCTIONAL

## 2024-01-18 DEVICE — BONE, CHIP, CANCELLOUS, 1.7-10 MM, 15 CC: Type: IMPLANTABLE DEVICE | Site: FOOT | Status: FUNCTIONAL

## 2024-01-18 RX ORDER — LIDOCAINE HYDROCHLORIDE 10 MG/ML
0.1 INJECTION, SOLUTION EPIDURAL; INFILTRATION; INTRACAUDAL; PERINEURAL ONCE
Status: DISCONTINUED | OUTPATIENT
Start: 2024-01-18 | End: 2024-01-18 | Stop reason: HOSPADM

## 2024-01-18 RX ORDER — DIPHENHYDRAMINE HYDROCHLORIDE 50 MG/ML
12.5 INJECTION INTRAMUSCULAR; INTRAVENOUS ONCE AS NEEDED
Status: DISCONTINUED | OUTPATIENT
Start: 2024-01-18 | End: 2024-01-18 | Stop reason: HOSPADM

## 2024-01-18 RX ORDER — SODIUM CHLORIDE, SODIUM LACTATE, POTASSIUM CHLORIDE, CALCIUM CHLORIDE 600; 310; 30; 20 MG/100ML; MG/100ML; MG/100ML; MG/100ML
100 INJECTION, SOLUTION INTRAVENOUS CONTINUOUS
Status: DISCONTINUED | OUTPATIENT
Start: 2024-01-18 | End: 2024-01-18 | Stop reason: HOSPADM

## 2024-01-18 RX ORDER — CEFAZOLIN SODIUM 2 G/100ML
2 INJECTION, SOLUTION INTRAVENOUS ONCE
Status: DISCONTINUED | OUTPATIENT
Start: 2024-01-18 | End: 2024-01-18 | Stop reason: HOSPADM

## 2024-01-18 RX ORDER — SODIUM CHLORIDE 0.9 G/100ML
IRRIGANT IRRIGATION AS NEEDED
Status: DISCONTINUED | OUTPATIENT
Start: 2024-01-18 | End: 2024-01-18 | Stop reason: HOSPADM

## 2024-01-18 RX ORDER — NAPROXEN SODIUM 220 MG/1
81 TABLET, FILM COATED ORAL 2 TIMES DAILY
Qty: 28 TABLET | Refills: 0 | Status: SHIPPED | OUTPATIENT
Start: 2024-01-18 | End: 2024-02-01

## 2024-01-18 RX ORDER — DEXAMETHASONE SODIUM PHOSPHATE 4 MG/ML
INJECTION, SOLUTION INTRA-ARTICULAR; INTRALESIONAL; INTRAMUSCULAR; INTRAVENOUS; SOFT TISSUE AS NEEDED
Status: DISCONTINUED | OUTPATIENT
Start: 2024-01-18 | End: 2024-01-18

## 2024-01-18 RX ORDER — LIDOCAINE HYDROCHLORIDE 20 MG/ML
INJECTION, SOLUTION INFILTRATION; PERINEURAL AS NEEDED
Status: DISCONTINUED | OUTPATIENT
Start: 2024-01-18 | End: 2024-01-18

## 2024-01-18 RX ORDER — DOCUSATE SODIUM 100 MG/1
100 CAPSULE, LIQUID FILLED ORAL 2 TIMES DAILY
Qty: 28 CAPSULE | Refills: 0 | Status: SHIPPED | OUTPATIENT
Start: 2024-01-18 | End: 2024-02-01

## 2024-01-18 RX ORDER — CEFAZOLIN 1 G/1
INJECTION, POWDER, FOR SOLUTION INTRAVENOUS AS NEEDED
Status: DISCONTINUED | OUTPATIENT
Start: 2024-01-18 | End: 2024-01-18

## 2024-01-18 RX ORDER — PROPOFOL 10 MG/ML
INJECTION, EMULSION INTRAVENOUS AS NEEDED
Status: DISCONTINUED | OUTPATIENT
Start: 2024-01-18 | End: 2024-01-18

## 2024-01-18 RX ORDER — NORETHINDRONE AND ETHINYL ESTRADIOL 0.5-0.035
KIT ORAL AS NEEDED
Status: DISCONTINUED | OUTPATIENT
Start: 2024-01-18 | End: 2024-01-18

## 2024-01-18 RX ORDER — ONDANSETRON HYDROCHLORIDE 2 MG/ML
4 INJECTION, SOLUTION INTRAVENOUS ONCE AS NEEDED
Status: DISCONTINUED | OUTPATIENT
Start: 2024-01-18 | End: 2024-01-18 | Stop reason: HOSPADM

## 2024-01-18 RX ORDER — MIDAZOLAM HYDROCHLORIDE 1 MG/ML
INJECTION INTRAMUSCULAR; INTRAVENOUS AS NEEDED
Status: DISCONTINUED | OUTPATIENT
Start: 2024-01-18 | End: 2024-01-18

## 2024-01-18 RX ORDER — FENTANYL CITRATE 50 UG/ML
INJECTION, SOLUTION INTRAMUSCULAR; INTRAVENOUS AS NEEDED
Status: DISCONTINUED | OUTPATIENT
Start: 2024-01-18 | End: 2024-01-18

## 2024-01-18 RX ORDER — ONDANSETRON HYDROCHLORIDE 2 MG/ML
INJECTION, SOLUTION INTRAVENOUS AS NEEDED
Status: DISCONTINUED | OUTPATIENT
Start: 2024-01-18 | End: 2024-01-18

## 2024-01-18 RX ORDER — OXYCODONE HYDROCHLORIDE 5 MG/1
5 TABLET ORAL EVERY 6 HOURS PRN
Qty: 28 TABLET | Refills: 0 | Status: SHIPPED | OUTPATIENT
Start: 2024-01-18 | End: 2024-01-25

## 2024-01-18 RX ORDER — OXYCODONE HYDROCHLORIDE 5 MG/1
5 TABLET ORAL EVERY 4 HOURS PRN
Status: DISCONTINUED | OUTPATIENT
Start: 2024-01-18 | End: 2024-01-18 | Stop reason: HOSPADM

## 2024-01-18 RX ORDER — LABETALOL HYDROCHLORIDE 5 MG/ML
5 INJECTION, SOLUTION INTRAVENOUS ONCE AS NEEDED
Status: DISCONTINUED | OUTPATIENT
Start: 2024-01-18 | End: 2024-01-18 | Stop reason: HOSPADM

## 2024-01-18 RX ADMIN — SODIUM CHLORIDE, POTASSIUM CHLORIDE, SODIUM LACTATE AND CALCIUM CHLORIDE: 600; 310; 30; 20 INJECTION, SOLUTION INTRAVENOUS at 09:00

## 2024-01-18 RX ADMIN — LIDOCAINE HYDROCHLORIDE 100 MG: 20 INJECTION, SOLUTION INFILTRATION; PERINEURAL at 09:15

## 2024-01-18 RX ADMIN — FENTANYL CITRATE 50 MCG: 50 INJECTION, SOLUTION INTRAMUSCULAR; INTRAVENOUS at 09:15

## 2024-01-18 RX ADMIN — FENTANYL CITRATE 50 MCG: 50 INJECTION, SOLUTION INTRAMUSCULAR; INTRAVENOUS at 09:17

## 2024-01-18 RX ADMIN — EPHEDRINE SULFATE 5 MG: 50 INJECTION, SOLUTION INTRAVENOUS at 09:53

## 2024-01-18 RX ADMIN — SODIUM CHLORIDE, POTASSIUM CHLORIDE, SODIUM LACTATE AND CALCIUM CHLORIDE 100 ML/HR: 600; 310; 30; 20 INJECTION, SOLUTION INTRAVENOUS at 08:03

## 2024-01-18 RX ADMIN — ONDANSETRON 4 MG: 2 INJECTION INTRAMUSCULAR; INTRAVENOUS at 10:48

## 2024-01-18 RX ADMIN — EPHEDRINE SULFATE 15 MG: 50 INJECTION, SOLUTION INTRAVENOUS at 09:58

## 2024-01-18 RX ADMIN — MIDAZOLAM HYDROCHLORIDE 2 MG: 1 INJECTION, SOLUTION INTRAMUSCULAR; INTRAVENOUS at 09:10

## 2024-01-18 RX ADMIN — OXYCODONE HYDROCHLORIDE 5 MG: 5 TABLET ORAL at 12:15

## 2024-01-18 RX ADMIN — EPHEDRINE SULFATE 15 MG: 50 INJECTION, SOLUTION INTRAVENOUS at 10:10

## 2024-01-18 RX ADMIN — CEFAZOLIN 3 G: 1 INJECTION, POWDER, FOR SOLUTION INTRAMUSCULAR; INTRAVENOUS at 09:18

## 2024-01-18 RX ADMIN — PROPOFOL 200 MG: 10 INJECTION, EMULSION INTRAVENOUS at 09:15

## 2024-01-18 RX ADMIN — DEXAMETHASONE SODIUM PHOSPHATE 4 MG: 4 INJECTION, SOLUTION INTRAMUSCULAR; INTRAVENOUS at 10:48

## 2024-01-18 RX ADMIN — EPHEDRINE SULFATE 15 MG: 50 INJECTION, SOLUTION INTRAVENOUS at 09:47

## 2024-01-18 ASSESSMENT — PAIN SCALES - GENERAL
PAINLEVEL_OUTOF10: 0 - NO PAIN
PAINLEVEL_OUTOF10: 0 - NO PAIN
PAINLEVEL_OUTOF10: 3
PAINLEVEL_OUTOF10: 4
PAINLEVEL_OUTOF10: 3
PAINLEVEL_OUTOF10: 0 - NO PAIN
PAINLEVEL_OUTOF10: 3
PAINLEVEL_OUTOF10: 0 - NO PAIN

## 2024-01-18 ASSESSMENT — PAIN - FUNCTIONAL ASSESSMENT
PAIN_FUNCTIONAL_ASSESSMENT: 0-10

## 2024-01-18 ASSESSMENT — PAIN DESCRIPTION - DESCRIPTORS
DESCRIPTORS: THROBBING

## 2024-01-18 ASSESSMENT — COLUMBIA-SUICIDE SEVERITY RATING SCALE - C-SSRS
6. HAVE YOU EVER DONE ANYTHING, STARTED TO DO ANYTHING, OR PREPARED TO DO ANYTHING TO END YOUR LIFE?: NO
2. HAVE YOU ACTUALLY HAD ANY THOUGHTS OF KILLING YOURSELF?: NO
1. IN THE PAST MONTH, HAVE YOU WISHED YOU WERE DEAD OR WISHED YOU COULD GO TO SLEEP AND NOT WAKE UP?: NO

## 2024-01-18 NOTE — BRIEF OP NOTE
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]     * Arthritis of foot [M19.079]     Procedures  Right Foot Arthrodesis  09800 - OR ARTHRODESIS GREAT TOE METATARSOPHALANGEAL JOINT    Excision Bone Foot  99731 - OR PRTL EXC B1 TARSAL/METAR B1 XCP TALUS/CALCANEUS    Right Foot Claw/Hammertoe Correction  52671 - OR CORRECTION HAMMERTOE      Surgeons      * Josue Villa - Primary    Resident/Fellow/Other Assistant:  Surgeon(s) and Role:     * Tommy Young MD - Assisting    Procedure Summary  Anesthesia: Regional  ASA: III  Anesthesia Staff: Anesthesiologist: Deo Contreras MD  CRNA: RAUL Victoria-CRNA  Estimated Blood Loss: 0mL  Intra-op Medications:   Medication Name Total Dose   sodium chloride 0.9 % irrigation solution 1,000 mL              Anesthesia Record               Intraprocedure I/O Totals       None           Specimen:   ID Type Source Tests Collected by Time   1 : SILICONE SYNOVITIS Tissue DIGIT FIRST, RIGHT FOOT SURGICAL PATHOLOGY EXAM Josue Villa MD 2024 9618        Staff:   Circulator: Diamante Smalls RN; Mica Luciano RN  Scrub Person: Jasmin Sharp          Findings: severe ersoin 1st Mtp with silicone implant; severe OA 2nd MTP. OA 3rd DIP    Complications:  None; patient tolerated the procedure well.     Disposition: PACU - hemodynamically stable.  Condition: stable  Specimens Collected:   ID Type Source Tests Collected by Time   1 : SILICONE SYNOVITIS Tissue DIGIT FIRST, RIGHT FOOT SURGICAL PATHOLOGY EXAM Josue Villa MD 2024 0965     Attending Attestation: I was present and scrubbed for the entire procedure.    Josue Villa  Phone Number: [phone]

## 2024-01-18 NOTE — ANESTHESIA PROCEDURE NOTES
Airway  Date/Time: 1/18/2024 9:17 AM  Urgency: elective    Airway not difficult    Staffing  Performed: CRNA   Authorized by: Deo Contreras MD    Performed by: RAUL Victoria-MICKEY  Patient location during procedure: OR    Indications and Patient Condition  Indications for airway management: anesthesia  Spontaneous ventilation: present  Sedation level: minimal  Preoxygenated: yes  Patient position: sniffing  Mask difficulty assessment: 1 - vent by mask    Final Airway Details  Final airway type: supraglottic airway (I Gel)      Successful airway: Size 5     Number of attempts at approach: 1  Number of other approaches attempted: 0

## 2024-01-18 NOTE — PERIOPERATIVE NURSING NOTE
1142: Patient to bay with anesthesia and surgical team present. Handoff report and plan of care reviewed, all questions answered. VSS.    1145: Patient removed from supplemental oxygen at this time.    1150: patient tolerating sips of sugar free ginger ale    1200: Post op blood glucose 120mg/dl    1205: Family updated via text message    1210: patient tolerating maya crackers at this time    1215: 5mg oxycodone administered per given orders, allergies verified prior to administration.    1245: Patient meets phase 1 discharge criteria at this time; cleared for phase 2 per MD Contreras at this time.    1246: Patient to phase 2 at this time    1255: Family at bedside    1300: Patient dressed with family assistance.     1305: Dr Villa at bedside at this time    1307: Patient in post op shoe at this time    1310: Peripheral IV removed with no complications.     1315: Discharge instructions reviewed with patient and family, no further questions at this time.     1330: Patient to main lobby via transport with all belongings in stable condition. Phase 2 complete.             Left message to return our call. Unable to make order for CPAP visible to [facility]. We would need a fax number to fax the order to or we can print out the order for the pt to .

## 2024-01-18 NOTE — DISCHARGE INSTRUCTIONS
Additional instructions  Ice/Elevate operative extremity.  Keep dressing clean and dry.    Keep dressing in place.  Weightbearing: NWB on operative extremity with crutches/walker until block wears off.  Then foot flat weightbearing in surgical shoe with crutches/walker for support.  Start Tylenol 650 mg by mouth every 6 hours today as needed for pain.  Oxycodone 1 by mouth every 6 hours as needed for pain when block wears off.  Aspirin 81 mg by mouth twice daily.  Colace twice a day for constipation.  F/U with Dr. Villa in 2 weeks.  Call [phone] for appointment time.

## 2024-01-18 NOTE — ANESTHESIA PREPROCEDURE EVALUATION
Patient: Scotty Amaro    Procedure Information       Date/Time: 01/18/24 0930    Procedures:       Right Foot Arthrodesis (Right: Foot)      Excision Bone Foot (Right: Foot)      Right Foot Claw/Hammertoe Correction (Right: Foot)    Location: [facility] OR 17 / Virtual [facility] OR    Surgeons: Josue Villa MD          T2DM RADHA on CPAP HTN HLD former smoker    Relevant Problems   Other   (+) Arthritis of foot       Clinical information reviewed:   Tobacco  Allergies    Med Hx  Surg Hx   Fam Hx  Soc Hx        NPO Detail:  NPO/Void Status  Carbohydrate Drink Given Prior to Surgery? : N  Date of Last Liquid: 01/18/24  Time of Last Liquid: 0515  Date of Last Solid: 01/17/24  Time of Last Solid: 2000  Last Intake Type: Clear fluids         Physical Exam    Airway  Mallampati: II  TM distance: >3 FB  Neck ROM: full     Cardiovascular   Rhythm: regular  Rate: normal     Dental    Pulmonary   Breath sounds clear to auscultation     Abdominal            Anesthesia Plan    History of general anesthesia?: yes  History of complications of general anesthesia?: no    ASA 3     general     intravenous induction   Anesthetic plan and risks discussed with patient.    Plan discussed with CRNA.

## 2024-01-18 NOTE — ANESTHESIA POSTPROCEDURE EVALUATION
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    Anesthesia Post Evaluation    Patient location during evaluation: bedside  Patient participation: complete - patient participated  Level of consciousness: awake  Pain management: adequate  Multimodal analgesia pain management approach  Airway patency: patent  Cardiovascular status: stable  Respiratory status: spontaneous ventilation and unassisted  Hydration status: acceptable  Postoperative Nausea and Vomiting: none  Comments: No significant PONV.        No notable events documented.

## 2024-01-19 ASSESSMENT — PAIN SCALES - GENERAL: PAINLEVEL_OUTOF10: 0 - NO PAIN

## 2024-01-19 NOTE — OP NOTE
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]     * Arthritis of foot [M19.079]     Procedures  Right Foot Arthrodesis; 3rd DIP Fusion; 2nd MTP Arthroplasty  46213 - OR ARTHRODESIS GREAT TOE METATARSOPHALANGEAL JOINT    Excision Bone Foot  40378 - OR PRTL EXC B1 TARSAL/METAR B1 XCP TALUS/CALCANEUS    Right Foot Claw/Hammertoe Correction  81800 - OR CORRECTION HAMMERTOE      Surgeons      * Josue Villa - Primary    Resident/Fellow/Other Assistant:  Surgeon(s) and Role:     * Tommy Young MD - Assisting    Procedure Summary  Anesthesia: Regional  ASA: III  Anesthesia Staff: Anesthesiologist: Deo Contreras MD  CRNA: RAUL Victoria-CRNA  Estimated Blood Loss: 0 mL  Intra-op Medications:   Medication Name Total Dose   sodium chloride 0.9 % irrigation solution 1,000 mL   BUPivacaine HCl (Marcaine) 0.5 % (5 mg/mL) 30 mL, lidocaine (Xylocaine) 30 mL syringe 20 mL              Anesthesia Record               Intraprocedure I/O Totals          Output    Urine 0 mL    Est. Blood Loss 20 mL    Total Output 20 mL          Specimen:   ID Type Source Tests Collected by Time   1 : SILICONE SYNOVITIS Tissue DIGIT FIRST, RIGHT FOOT SURGICAL PATHOLOGY EXAM Josue Villa MD 2024 0948        Staff:   Circulator: Diamante Smalls RN; Mica Luciano RN  Scrub Person: Jasmin Sharp         Drains and/or Catheters: * None in log *    Tourniquet Times:     Total Tourniquet Time Documented:  area (Right) - 105 minutes  Total: area (Right) - 105 minutes      Implants:  Implants       Type Name Action Serial No.       Graft BONE, CHIP, CANCELLOUS, 1.7-10 MM, 15 CC - [id number] - [id number] Implanted [id number]     Graft BONE, CHIP, CANCELLOUS, 1.7-10 MM, 5 CC - [id number] - [id number] Implanted [id number]     Implant BONE, PUTTY DBX  5CC DE-MINERAL ASEPTIC - [id number] - [id number] Implanted [id number]     Plate MAXFORCE MTP PLATE LONG 5-5 RIGHT Implanted N/A     Screw YAEL KREULOCK SCREW TI, 3.0X16MM Implanted N/A     Screw YAEL KREULOCK SCREW TI 3.0X18 Implanted N/A     Screw YAEL KREULOCK SCREW TI 3.0X18 Wasted N/A     Screw SCREW, LOW PROFILE, CORTICAL, 3.0 X 18MM - SN/A - [id number] Implanted N/A     Screw YAEL KREULOCK SCREW TI, 3.0 X 20 Implanted N/A     Screw WIRE, SILVIA, DUAL MANUEL, 0.062 X 9 IN, STAINLESS STEEL - SN/A - [id number] Implanted N/A              Findings: Severe erosive change first MTP joint with silicone implant.  Severe inflammatory arthrosis second MTP joint.  Degenerative DIP joint arthritis third toe.    Indications: Scotty Amaro is an 73 y.o. 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  Status post remote podiatric surgery with silicone implant to first MTP joint with erosive failure.  Also arthrosis at second MTP joint and painful DIP arthritis third toe status post remote surgery.  Here for reconstruction.  Understood risk of surgery including bleeding, infection, nonunion, malunion, possible need for further surgery.  Understood these wish wished proceed.    The patient was seen in the preoperative area. The risks, benefits, complications, treatment options, non-operative alternatives, expected recovery and outcomes were discussed with the patient. The possibilities of reaction to medication, pulmonary aspiration, injury to surrounding structures, bleeding, recurrent infection, the need for additional procedures, failure to diagnose a condition, and creating a complication requiring transfusion or  operation were discussed with the patient. The patient concurred with the proposed plan, giving informed consent.  The site of surgery was properly noted/marked if necessary per policy. The patient has been actively warmed in preoperative area. Preoperative antibiotics have been ordered and given within 1 hours of incision. Venous thrombosis prophylaxis are not indicated.    Procedure Details:   Preop DX: Failed right first MTP silicone implant.  Right second MTP arthritis.  Right third DIP arthritis.  Postop DX: Same  Procedure: Right first MTP fusion.  Right second MTP distraction arthroplasty.  Right third DIP fusion.  Surgeon: Josue Villa MD  Asst: Tommy Young MD  Anesthesia: General and local  Clinical Note: 73-year-old male status post remote surgery with silicone implant to the right first MTP joint and second and third toe correction.  Has resolved and bony erosion the first MTP joint from failure of the silicone implant.  Has developed severe arthrosis of the second MTP joint and painful varus deformity of the third DIP joint.  Here for surgical correction.  Understood the risk of surgery being bleeding, infection, nonunion, malunion, possible need for further surgery or shoewear modifications.  As these risk wished proceed.  Procedure Note: Patient brought to operating room.  Timeout performed.  Antibiotics given IV.  General anesthesia given.  Right foot was prepped and draped typical sterile fashion with a tourniquet on the calf.  Ring block was performed across the forefoot.  Foot was exsanguinated tourniquet inflated to 275 mmHg.  Longitudinal incision made over the first ray first MTP joint.  Flaps developed.  Capsule was incised.  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  This was all sharply dissected free exposing the bone.  The silicone implant was fractured.  First metatarsal and proximal phalanx were then removed.  The metal grommets at the the stem and silicone  implants were removed from the canals.  Significant synovial reaction within the bone was abraded sharply with a curette.  There was intact cortical bone of the proximal phalanx and first metatarsal.  All synovial inflammation was excised sharply.  Bone was resected at the joint to bring the first MTP joint into a normal neutral position.  A combination of allograft bone chips and demineralized bone matrix was morselized and packed within the canals of the proximal phalanx and first metatarsal.  Good bony apposition was obtained.  This held tentatively with the pin.  A dorsal locking plate was then fixed across the first MTP joint with 3 locking screws in the proximal phalanx.  A compression cortical screw was placed in the metatarsal with good compression at the joint.  2 additional locking screws were placed.  Attention was then turned to the second ray.  Longitudinal incision made over the second MTP joint.  Extensor tendon was taken medially.  Capsule was incised longitudinally.  There was severe arthrosis with bony erosion of the second MTP joint with large dorsal spurs of the proximal phalanx.  The base the proximal phalanx was resected with a saw.  A second metatarsal head was also refashion with a saw to a more curved structure.  Were able to get good distraction at the joint after resecting the bony spurs around the joint.  The toe was held distracted with a 0.62 K wire brought out the tip of the toe.  The capsule was pursestring with an 0 Vicryl suture between the metatarsal and the toe.  Attention was then turned to the third toe elliptical incision made of the DIP joint.  Joint was exposed.  Joint was resected with a bone cutter and rongeur to good bleeding bone.  Was held with a 0.62 K wire.  All wounds were irrigated.  Closed in layers with nylon sutures in the skin.  Soft bulky dressing applied.      Complications:  None; patient tolerated the procedure well.    Disposition: PACU - hemodynamically  stable.  Condition: stable         Additional Details: None    Attending Attestation: I was present and scrubbed for the entire procedure.    Josue LINK Kateykatia  Phone Number: [phone]

## 2024-01-25 LAB
LABORATORY COMMENT REPORT: NORMAL
PATH REPORT.COMMENTS IMP SPEC: NORMAL
PATH REPORT.FINAL DX SPEC: NORMAL
PATH REPORT.GROSS SPEC: NORMAL
PATH REPORT.RELEVANT HX SPEC: NORMAL
PATH REPORT.TOTAL CANCER: NORMAL

## 2024-01-31 ENCOUNTER — OFFICE VISIT (OUTPATIENT)
Dept: ORTHOPEDIC SURGERY | Facility: CLINIC | Age: 74
End: 2024-01-31

## 2024-01-31 VITALS — WEIGHT: 274 LBS | BODY MASS INDEX: 35.16 KG/M2 | HEIGHT: 74 IN

## 2024-01-31 DIAGNOSIS — M19.079 ARTHRITIS OF FOOT: ICD-10-CM

## 2024-01-31 DIAGNOSIS — T84.498S FAILED ORTHOPEDIC IMPLANT, SEQUELA: Primary | ICD-10-CM

## 2024-01-31 PROCEDURE — 99024 POSTOP FOLLOW-UP VISIT: CPT | Performed by: ORTHOPAEDIC SURGERY

## 2024-01-31 PROCEDURE — 1159F MED LIST DOCD IN RCRD: CPT | Performed by: ORTHOPAEDIC SURGERY

## 2024-01-31 PROCEDURE — 1125F AMNT PAIN NOTED PAIN PRSNT: CPT | Performed by: ORTHOPAEDIC SURGERY

## 2024-01-31 PROCEDURE — L3260 AMBULATORY SURGICAL BOOT EAC: HCPCS | Performed by: ORTHOPAEDIC SURGERY

## 2024-01-31 PROCEDURE — 1036F TOBACCO NON-USER: CPT | Performed by: ORTHOPAEDIC SURGERY

## 2024-01-31 ASSESSMENT — PAIN DESCRIPTION - DESCRIPTORS: DESCRIPTORS: ACHING

## 2024-01-31 ASSESSMENT — PAIN - FUNCTIONAL ASSESSMENT: PAIN_FUNCTIONAL_ASSESSMENT: 0-10

## 2024-01-31 ASSESSMENT — PAIN SCALES - GENERAL: PAINLEVEL_OUTOF10: 1

## 2024-01-31 NOTE — PROGRESS NOTES
S: Pain well controlled.     O: Incisions healing nicely. Good alignment.    XR: I personally reviewed the following radiographic exams: Intraoperative films right foot shows well aligned first MTP joint with plate and lesser toe K wires.    A: S/P right forefoot reconstruction.    P: Sutures out.  Foot flat weightbearing in surgical shoe.  Follow-up x-ray of right foot in 3 weeks.

## 2024-02-21 ENCOUNTER — HOSPITAL ENCOUNTER (OUTPATIENT)
Dept: RADIOLOGY | Facility: CLINIC | Age: 74
Discharge: HOME | End: 2024-02-21
Payer: MEDICARE

## 2024-02-21 ENCOUNTER — OFFICE VISIT (OUTPATIENT)
Dept: ORTHOPEDIC SURGERY | Facility: CLINIC | Age: 74
End: 2024-02-21
Payer: MEDICARE

## 2024-02-21 DIAGNOSIS — M19.079 ARTHRITIS OF FOOT: ICD-10-CM

## 2024-02-21 DIAGNOSIS — T84.498S FAILED ORTHOPEDIC IMPLANT, SEQUELA: Primary | ICD-10-CM

## 2024-02-21 DIAGNOSIS — T84.498S FAILED ORTHOPEDIC IMPLANT, SEQUELA: ICD-10-CM

## 2024-02-21 PROCEDURE — 73630 X-RAY EXAM OF FOOT: CPT | Mod: RT

## 2024-02-21 PROCEDURE — 1159F MED LIST DOCD IN RCRD: CPT | Performed by: ORTHOPAEDIC SURGERY

## 2024-02-21 PROCEDURE — 1036F TOBACCO NON-USER: CPT | Performed by: ORTHOPAEDIC SURGERY

## 2024-02-21 PROCEDURE — 1125F AMNT PAIN NOTED PAIN PRSNT: CPT | Performed by: ORTHOPAEDIC SURGERY

## 2024-02-21 PROCEDURE — 99024 POSTOP FOLLOW-UP VISIT: CPT | Performed by: ORTHOPAEDIC SURGERY

## 2024-02-21 PROCEDURE — 73630 X-RAY EXAM OF FOOT: CPT | Mod: RIGHT SIDE | Performed by: RADIOLOGY

## 2024-02-21 NOTE — PROGRESS NOTES
S: Pain well controlled.     O: Incisions healing nicely. Good alignment.    XR: I personally reviewed the following radiographic exams: X-rays right foot shows well aligned first MTP fusion.  Well aligned lesser toes with pin fixation.    A: S/P right forefoot reconstruction    P: Pins removed.  Continue surgical shoe.  Follow-up x-ray right foot in 3 weeks.  Plan transition shoewear at that time

## 2024-03-13 ENCOUNTER — HOSPITAL ENCOUNTER (OUTPATIENT)
Dept: RADIOLOGY | Facility: CLINIC | Age: 74
Discharge: HOME | End: 2024-03-13
Payer: MEDICARE

## 2024-03-13 ENCOUNTER — OFFICE VISIT (OUTPATIENT)
Dept: ORTHOPEDIC SURGERY | Facility: CLINIC | Age: 74
End: 2024-03-13
Payer: MEDICARE

## 2024-03-13 DIAGNOSIS — M19.079 ARTHRITIS OF FOOT: Primary | ICD-10-CM

## 2024-03-13 DIAGNOSIS — M19.079 ARTHRITIS OF FOOT: ICD-10-CM

## 2024-03-13 DIAGNOSIS — T84.498S FAILED ORTHOPEDIC IMPLANT, SEQUELA: ICD-10-CM

## 2024-03-13 PROCEDURE — 1036F TOBACCO NON-USER: CPT | Performed by: ORTHOPAEDIC SURGERY

## 2024-03-13 PROCEDURE — 1159F MED LIST DOCD IN RCRD: CPT | Performed by: ORTHOPAEDIC SURGERY

## 2024-03-13 PROCEDURE — 73630 X-RAY EXAM OF FOOT: CPT | Mod: RIGHT SIDE | Performed by: RADIOLOGY

## 2024-03-13 PROCEDURE — 99024 POSTOP FOLLOW-UP VISIT: CPT | Performed by: ORTHOPAEDIC SURGERY

## 2024-03-13 PROCEDURE — 73630 X-RAY EXAM OF FOOT: CPT | Mod: RT

## 2024-03-14 NOTE — PROGRESS NOTES
S: Pain well controlled.  Wearing surgical shoe.    O: Incisions healing nicely. Good alignment.  No pain with first MTP stress.  No pain with passive second MTP motion.    XR: I personally reviewed the following radiographic exams: Right foot shows healing first MTP fusion with dorsal plate.  Decompressed second MTP joint.    A: S/P right forefoot reconstruction.    P: Discussed supportive shoe wear.  Transition from surgical shoe into a supportive shoe over the next few weeks.  Follow-up x-ray right foot in 3 weeks.

## 2024-04-03 ENCOUNTER — OFFICE VISIT (OUTPATIENT)
Dept: ORTHOPEDIC SURGERY | Facility: CLINIC | Age: 74
End: 2024-04-03
Payer: MEDICARE

## 2024-04-03 ENCOUNTER — HOSPITAL ENCOUNTER (OUTPATIENT)
Dept: RADIOLOGY | Facility: CLINIC | Age: 74
Discharge: HOME | End: 2024-04-03
Payer: MEDICARE

## 2024-04-03 DIAGNOSIS — M19.079 ARTHRITIS OF FOOT: ICD-10-CM

## 2024-04-03 DIAGNOSIS — M19.079 ARTHRITIS OF FOOT: Primary | ICD-10-CM

## 2024-04-03 DIAGNOSIS — T84.498S FAILED ORTHOPEDIC IMPLANT, SEQUELA: ICD-10-CM

## 2024-04-03 PROCEDURE — 1036F TOBACCO NON-USER: CPT | Performed by: ORTHOPAEDIC SURGERY

## 2024-04-03 PROCEDURE — 73630 X-RAY EXAM OF FOOT: CPT | Mod: RIGHT SIDE | Performed by: RADIOLOGY

## 2024-04-03 PROCEDURE — 73630 X-RAY EXAM OF FOOT: CPT | Mod: RT

## 2024-04-03 PROCEDURE — 99024 POSTOP FOLLOW-UP VISIT: CPT | Performed by: ORTHOPAEDIC SURGERY

## 2024-04-03 PROCEDURE — 1159F MED LIST DOCD IN RCRD: CPT | Performed by: ORTHOPAEDIC SURGERY

## 2024-04-03 ASSESSMENT — PAIN - FUNCTIONAL ASSESSMENT: PAIN_FUNCTIONAL_ASSESSMENT: NO/DENIES PAIN

## 2024-04-03 NOTE — PROGRESS NOTES
Wearing regular shoes.  Having no pain.    Exam: No pain second MTP motion.  No pain with first MTP stress.  Minimal swelling.    I personally reviewed the following radiographic exams: Right foot shows apparent healed first MTP fusion with dorsal plate.  Decompressed second MTP joint.    Assessment: Status post right forefoot reconstruction.    Plan: No restrictions.  Progress activity and shoewear as tolerated.  Follow-up in the future if pain develops.

## (undated) DEVICE — CUFF, TOURNIQUET, 30 X 4, DUAL PORT/SNGL BLADDER, DISP, LF

## (undated) DEVICE — SUTURE, MONOCRYL, 3-0, 18 IN, PS2, UNDYED

## (undated) DEVICE — BANDAGE, ELASTIC, ACE, SELF-CLOSURE, 3 IN X 5 YD, NONSTERILE

## (undated) DEVICE — BANDAGE, ELASTIC, PREMIUM, SELF-CLOSE, 6 IN X 5.5 YD, STERILE

## (undated) DEVICE — PADDING, WEBRIL, UNDERCAST, STERILE, 6 IN

## (undated) DEVICE — BANDAGE, COFLEX, 4 X 5 YDS, FOAM TAN, STERILE, LF

## (undated) DEVICE — BB-TAK, MTP, THREADED

## (undated) DEVICE — GLOVE, SURGICAL, PROTEXIS PI MICRO, 8.0, PF, LF

## (undated) DEVICE — PAD, GROUNDING, ELECTROSURGICAL, W/9 FT CABLE, POLYHESIVE II, ADULT, LF

## (undated) DEVICE — SUTURE, MONOCRYL, 4-0, 27 IN, PS-2, UNDYED

## (undated) DEVICE — DRILL BIT, 2.2MM

## (undated) DEVICE — DRAPE, INCISE, STERI DRAPE, 36 X 34 IN, DISPOSABLE, STERILE

## (undated) DEVICE — TOWEL, SURGICAL, NEURO, O/R, 16 X 26, BLUE, STERILE

## (undated) DEVICE — STOCKINETTE, IMPERVIOUS, 12 X 48 IN, LF, STERILE

## (undated) DEVICE — NEEDLE, HYPODERMIC, REGULAR WALL, REGULAR BEVEL, 22 G X 1 IN

## (undated) DEVICE — GLOVE, SURGICAL, PROTEXIS PI W/NEU-THERA, 8.0, PF, LF

## (undated) DEVICE — SUTURE, ETHILON, 4-0, BLK, MONO, PS-2 18

## (undated) DEVICE — DRESSING, GAUZE, FLUFF, 1 PLY, 18 X 36 IN

## (undated) DEVICE — GUIDEWIRE, W/TROCAR TIP, .062

## (undated) DEVICE — Device

## (undated) DEVICE — DRESSING, GAUZE, KERLIX, 12 PLY, 4 X 4 IN, STERILE

## (undated) DEVICE — ADHESIVE, DERMABOND, PRINEO, 12 X 9, STERILE

## (undated) DEVICE — DRESSING, GAUZE, PETROLATUM, PATCH, XEROFORM, 1 X 8 IN, STERILE

## (undated) DEVICE — SOLUTION, IRRIGATION, SODIUM CHLORIDE 0.9%, 1000 ML, POUR BOTTLE

## (undated) DEVICE — SUTURE, VICRYL, 0, 27 IN, CT-2, UNDYED